# Patient Record
Sex: MALE | Race: WHITE | NOT HISPANIC OR LATINO | URBAN - METROPOLITAN AREA
[De-identification: names, ages, dates, MRNs, and addresses within clinical notes are randomized per-mention and may not be internally consistent; named-entity substitution may affect disease eponyms.]

---

## 2023-07-25 ENCOUNTER — HOSPITAL ENCOUNTER (OUTPATIENT)
Dept: DATA CONVERSION | Facility: HOSPITAL | Age: 58
End: 2023-07-25
Attending: INTERNAL MEDICINE | Admitting: INTERNAL MEDICINE
Payer: COMMERCIAL

## 2023-07-25 DIAGNOSIS — I48.91 UNSPECIFIED ATRIAL FIBRILLATION (MULTI): ICD-10-CM

## 2023-10-21 ENCOUNTER — LAB (OUTPATIENT)
Dept: LAB | Facility: LAB | Age: 58
End: 2023-10-21
Payer: COMMERCIAL

## 2023-10-21 DIAGNOSIS — I48.0 PAROXYSMAL ATRIAL FIBRILLATION (MULTI): Primary | ICD-10-CM

## 2023-10-21 DIAGNOSIS — Z01.818 ENCOUNTER FOR OTHER PREPROCEDURAL EXAMINATION: ICD-10-CM

## 2023-10-21 LAB
ANION GAP SERPL CALC-SCNC: 15 MMOL/L (ref 10–20)
BUN SERPL-MCNC: 16 MG/DL (ref 6–23)
CALCIUM SERPL-MCNC: 9.5 MG/DL (ref 8.6–10.6)
CHLORIDE SERPL-SCNC: 107 MMOL/L (ref 98–107)
CO2 SERPL-SCNC: 24 MMOL/L (ref 21–32)
CREAT SERPL-MCNC: 1.18 MG/DL (ref 0.5–1.3)
ERYTHROCYTE [DISTWIDTH] IN BLOOD BY AUTOMATED COUNT: 12.1 % (ref 11.5–14.5)
GFR SERPL CREATININE-BSD FRML MDRD: 71 ML/MIN/1.73M*2
GLUCOSE SERPL-MCNC: 100 MG/DL (ref 74–99)
HCT VFR BLD AUTO: 48.9 % (ref 41–52)
HGB BLD-MCNC: 16.8 G/DL (ref 13.5–17.5)
INR PPP: 1.5 (ref 0.9–1.1)
MCH RBC QN AUTO: 29.2 PG (ref 26–34)
MCHC RBC AUTO-ENTMCNC: 34.4 G/DL (ref 32–36)
MCV RBC AUTO: 85 FL (ref 80–100)
NRBC BLD-RTO: 0 /100 WBCS (ref 0–0)
PLATELET # BLD AUTO: 176 X10*3/UL (ref 150–450)
PMV BLD AUTO: 12.6 FL (ref 7.5–11.5)
POTASSIUM SERPL-SCNC: 4.3 MMOL/L (ref 3.5–5.3)
PROTHROMBIN TIME: 17.2 SECONDS (ref 9.8–12.8)
RBC # BLD AUTO: 5.76 X10*6/UL (ref 4.5–5.9)
SODIUM SERPL-SCNC: 142 MMOL/L (ref 136–145)
WBC # BLD AUTO: 7 X10*3/UL (ref 4.4–11.3)

## 2023-10-21 PROCEDURE — 36415 COLL VENOUS BLD VENIPUNCTURE: CPT

## 2023-10-21 PROCEDURE — 85027 COMPLETE CBC AUTOMATED: CPT

## 2023-10-21 PROCEDURE — 80048 BASIC METABOLIC PNL TOTAL CA: CPT

## 2023-10-21 PROCEDURE — 85610 PROTHROMBIN TIME: CPT

## 2023-11-02 ENCOUNTER — ANESTHESIA EVENT (OUTPATIENT)
Dept: CARDIOLOGY | Facility: HOSPITAL | Age: 58
End: 2023-11-02
Payer: COMMERCIAL

## 2023-11-02 PROBLEM — I48.20 CHRONIC ATRIAL FIBRILLATION (MULTI): Status: ACTIVE | Noted: 2023-11-02

## 2023-11-02 NOTE — H&P
History Of Present Illness  Justin Martinez is a 58 y.o. male presenting with paroxysmal atrial fibrillation. PMHX notable for   1. HTN  2. Sleep apnea - moderate to severe diagnosed in 2021. Being fitted now with the CPAP  3. Calcium Score 105 2021  4. Atrial fibrillation - index diagnosis in 2021. He feels this was after COVID infection. Every time he has a viral illness he does develop AF. However he has been in AF since May of 2023. In July 2023 monitor showed persistent atrial fibrillation. On Xarelto for more than 3 weeks now.      July 2023: We discussed the atrial fibrillation at length. For now it is important that remains on OAT. Successful DCCV done July 26.      TESTING:    -ECHO (2021) structurally and functionally normal      -MONITOR: (June 2023) Preventice 7 days showed AF/AFL ([place] 100%) and 3 runs of NSVT longest lasting 10 beats. Min HR 31 bpm, Max  bpm, Avg HR 75 bpm. VE (Phyllis <1%)     He presents today for RFA atrial fibrillation.       Past Medical History  Past Medical History:   Diagnosis Date    Encounter for screening for cardiovascular disorders 10/07/2019    Screening for cardiovascular condition    Encounter for screening for malignant neoplasm of prostate 10/07/2019    Screening for prostate cancer       Surgical History  No past surgical history on file.     Social History  He has no history on file for tobacco use, alcohol use, and drug use.    Family History  No family history on file.     Allergies  Patient has no allergy information on record.    Review of Systems   Cardiovascular:  Positive for palpitations.   All other systems reviewed and are negative.       Physical Exam  Vitals reviewed.   Constitutional:       General: He is not in acute distress.     Appearance: Normal appearance. He is normal weight.   HENT:      Head: Normocephalic and atraumatic.      Nose: Nose normal. No congestion or rhinorrhea.      Mouth/Throat:      Mouth: Mucous membranes are moist.       Pharynx: Oropharynx is clear. No oropharyngeal exudate or posterior oropharyngeal erythema.   Eyes:      Extraocular Movements: Extraocular movements intact.      Conjunctiva/sclera: Conjunctivae normal.      Pupils: Pupils are equal, round, and reactive to light.   Neck:      Vascular: No carotid bruit.   Cardiovascular:      Rate and Rhythm: Normal rate and regular rhythm.      Pulses: Normal pulses.      Heart sounds: Normal heart sounds. No murmur heard.     No friction rub. No gallop.   Pulmonary:      Effort: Pulmonary effort is normal. No respiratory distress.      Breath sounds: Normal breath sounds. No wheezing or rales.   Abdominal:      General: Abdomen is flat. Bowel sounds are normal. There is no distension.      Palpations: Abdomen is soft.      Tenderness: There is no abdominal tenderness.   Musculoskeletal:         General: No swelling. Normal range of motion.      Cervical back: Normal range of motion.   Lymphadenopathy:      Cervical: No cervical adenopathy.   Skin:     General: Skin is warm and dry.      Capillary Refill: Capillary refill takes less than 2 seconds.      Findings: No erythema, lesion or rash.   Neurological:      General: No focal deficit present.      Mental Status: He is alert and oriented to person, place, and time. Mental status is at baseline.   Psychiatric:         Mood and Affect: Mood normal.         Behavior: Behavior normal.         Thought Content: Thought content normal.         Judgment: Judgment normal.          Last Recorded Vitals  There were no vitals taken for this visit.    Relevant Results        Results reviewed      Assessment/Plan   Principal Problem:    Chronic atrial fibrillation (CMS/HCC)    -RFA afib, general anesthesia        I spent 30 minutes in the professional and overall care of this patient.      Pan Rausch MD

## 2023-11-03 ENCOUNTER — ANESTHESIA (OUTPATIENT)
Dept: CARDIOLOGY | Facility: HOSPITAL | Age: 58
End: 2023-11-03
Payer: COMMERCIAL

## 2023-11-03 ENCOUNTER — HOSPITAL ENCOUNTER (OUTPATIENT)
Facility: HOSPITAL | Age: 58
Discharge: HOME | End: 2023-11-03
Attending: INTERNAL MEDICINE | Admitting: INTERNAL MEDICINE
Payer: COMMERCIAL

## 2023-11-03 DIAGNOSIS — I48.91 UNSPECIFIED ATRIAL FIBRILLATION (MULTI): ICD-10-CM

## 2023-11-03 PROBLEM — G89.29 CHRONIC NECK PAIN: Status: ACTIVE | Noted: 2023-11-03

## 2023-11-03 PROBLEM — K21.9 GASTROESOPHAGEAL REFLUX DISEASE: Status: ACTIVE | Noted: 2023-11-03

## 2023-11-03 PROBLEM — M54.2 CHRONIC NECK PAIN: Status: ACTIVE | Noted: 2023-11-03

## 2023-11-03 PROBLEM — Z79.01 ANTICOAGULANT LONG-TERM USE: Status: ACTIVE | Noted: 2023-11-03

## 2023-11-03 PROBLEM — I10 HTN (HYPERTENSION): Status: ACTIVE | Noted: 2023-11-03

## 2023-11-03 PROBLEM — G47.33 OSA (OBSTRUCTIVE SLEEP APNEA): Status: ACTIVE | Noted: 2023-11-03

## 2023-11-03 PROCEDURE — 93656 COMPRE EP EVAL ABLTJ ATR FIB: CPT | Performed by: INTERNAL MEDICINE

## 2023-11-03 PROCEDURE — 7100000011 HC EXTENDED STAY RECOVERY HOURLY - NURSING UNIT

## 2023-11-03 PROCEDURE — 2500000004 HC RX 250 GENERAL PHARMACY W/ HCPCS (ALT 636 FOR OP/ED): Performed by: INTERNAL MEDICINE

## 2023-11-03 PROCEDURE — 93657 TX L/R ATRIAL FIB ADDL: CPT | Performed by: INTERNAL MEDICINE

## 2023-11-03 PROCEDURE — 36620 INSERTION CATHETER ARTERY: CPT | Performed by: ANESTHESIOLOGY

## 2023-11-03 PROCEDURE — 92960 CARDIOVERSION ELECTRIC EXT: CPT | Mod: 59 | Performed by: INTERNAL MEDICINE

## 2023-11-03 PROCEDURE — C1730 CATH, EP, 19 OR FEW ELECT: HCPCS | Performed by: INTERNAL MEDICINE

## 2023-11-03 PROCEDURE — 92960 CARDIOVERSION ELECTRIC EXT: CPT | Performed by: INTERNAL MEDICINE

## 2023-11-03 PROCEDURE — C1766 INTRO/SHEATH,STRBLE,NON-PEEL: HCPCS | Performed by: INTERNAL MEDICINE

## 2023-11-03 PROCEDURE — A93656 PR EPHYS EVL TRNSPTL TX ATRIAL FIB ISOLAT PULM VEIN: Performed by: ANESTHESIOLOGY

## 2023-11-03 PROCEDURE — 3700000002 HC GENERAL ANESTHESIA TIME - EACH INCREMENTAL 1 MINUTE: Performed by: INTERNAL MEDICINE

## 2023-11-03 PROCEDURE — 2500000004 HC RX 250 GENERAL PHARMACY W/ HCPCS (ALT 636 FOR OP/ED)

## 2023-11-03 PROCEDURE — 2720000007 HC OR 272 NO HCPCS: Performed by: INTERNAL MEDICINE

## 2023-11-03 PROCEDURE — 2780000003 HC OR 278 NO HCPCS: Performed by: INTERNAL MEDICINE

## 2023-11-03 PROCEDURE — 7100000009 HC PHASE TWO TIME - INITIAL BASE CHARGE: Performed by: INTERNAL MEDICINE

## 2023-11-03 PROCEDURE — 85347 COAGULATION TIME ACTIVATED: CPT | Performed by: INTERNAL MEDICINE

## 2023-11-03 PROCEDURE — 2500000005 HC RX 250 GENERAL PHARMACY W/O HCPCS

## 2023-11-03 PROCEDURE — C1760 CLOSURE DEV, VASC: HCPCS | Performed by: INTERNAL MEDICINE

## 2023-11-03 PROCEDURE — 7100000010 HC PHASE TWO TIME - EACH INCREMENTAL 1 MINUTE: Performed by: INTERNAL MEDICINE

## 2023-11-03 PROCEDURE — 76937 US GUIDE VASCULAR ACCESS: CPT | Performed by: ANESTHESIOLOGY

## 2023-11-03 PROCEDURE — C1759 CATH, INTRA ECHOCARDIOGRAPHY: HCPCS | Performed by: INTERNAL MEDICINE

## 2023-11-03 PROCEDURE — C1732 CATH, EP, DIAG/ABL, 3D/VECT: HCPCS | Performed by: INTERNAL MEDICINE

## 2023-11-03 PROCEDURE — 85347 COAGULATION TIME ACTIVATED: CPT

## 2023-11-03 PROCEDURE — A93656 PR EPHYS EVL TRNSPTL TX ATRIAL FIB ISOLAT PULM VEIN

## 2023-11-03 PROCEDURE — 3700000001 HC GENERAL ANESTHESIA TIME - INITIAL BASE CHARGE: Performed by: INTERNAL MEDICINE

## 2023-11-03 RX ORDER — ROCURONIUM BROMIDE 10 MG/ML
INJECTION, SOLUTION INTRAVENOUS AS NEEDED
Status: DISCONTINUED | OUTPATIENT
Start: 2023-11-03 | End: 2023-11-03

## 2023-11-03 RX ORDER — HEPARIN SODIUM 1000 [USP'U]/ML
INJECTION, SOLUTION INTRAVENOUS; SUBCUTANEOUS AS NEEDED
Status: DISCONTINUED | OUTPATIENT
Start: 2023-11-03 | End: 2023-11-03 | Stop reason: HOSPADM

## 2023-11-03 RX ORDER — PROPOFOL 10 MG/ML
INJECTION, EMULSION INTRAVENOUS CONTINUOUS PRN
Status: DISCONTINUED | OUTPATIENT
Start: 2023-11-03 | End: 2023-11-03

## 2023-11-03 RX ORDER — PHENYLEPHRINE HCL IN 0.9% NACL 1 MG/10 ML
SYRINGE (ML) INTRAVENOUS AS NEEDED
Status: DISCONTINUED | OUTPATIENT
Start: 2023-11-03 | End: 2023-11-03

## 2023-11-03 RX ORDER — DILTIAZEM HYDROCHLORIDE 180 MG/1
180 CAPSULE, COATED, EXTENDED RELEASE ORAL DAILY
COMMUNITY
End: 2023-12-08 | Stop reason: SDUPTHER

## 2023-11-03 RX ORDER — PROTAMINE SULFATE 10 MG/ML
INJECTION, SOLUTION INTRAVENOUS AS NEEDED
Status: DISCONTINUED | OUTPATIENT
Start: 2023-11-03 | End: 2023-11-03

## 2023-11-03 RX ORDER — FAMOTIDINE 20 MG/1
20 TABLET, FILM COATED ORAL DAILY
COMMUNITY

## 2023-11-03 RX ORDER — MIDAZOLAM HYDROCHLORIDE 1 MG/ML
INJECTION INTRAMUSCULAR; INTRAVENOUS AS NEEDED
Status: DISCONTINUED | OUTPATIENT
Start: 2023-11-03 | End: 2023-11-03

## 2023-11-03 RX ORDER — PROPOFOL 10 MG/ML
INJECTION, EMULSION INTRAVENOUS AS NEEDED
Status: DISCONTINUED | OUTPATIENT
Start: 2023-11-03 | End: 2023-11-03

## 2023-11-03 RX ORDER — PHENYLEPHRINE 10 MG/250 ML(40 MCG/ML)IN 0.9 % SOD.CHLORIDE INTRAVENOUS
CONTINUOUS PRN
Status: DISCONTINUED | OUTPATIENT
Start: 2023-11-03 | End: 2023-11-03

## 2023-11-03 RX ORDER — FENTANYL CITRATE 50 UG/ML
INJECTION, SOLUTION INTRAMUSCULAR; INTRAVENOUS AS NEEDED
Status: DISCONTINUED | OUTPATIENT
Start: 2023-11-03 | End: 2023-11-03

## 2023-11-03 RX ORDER — LIDOCAINE HYDROCHLORIDE 20 MG/ML
INJECTION, SOLUTION INFILTRATION; PERINEURAL AS NEEDED
Status: DISCONTINUED | OUTPATIENT
Start: 2023-11-03 | End: 2023-11-03

## 2023-11-03 RX ORDER — HEPARIN SODIUM 10000 [USP'U]/100ML
INJECTION, SOLUTION INTRAVENOUS CONTINUOUS PRN
Status: DISCONTINUED | OUTPATIENT
Start: 2023-11-03 | End: 2023-11-03 | Stop reason: HOSPADM

## 2023-11-03 RX ORDER — DEXAMETHASONE SODIUM PHOSPHATE 4 MG/ML
INJECTION, SOLUTION INTRA-ARTICULAR; INTRALESIONAL; INTRAMUSCULAR; INTRAVENOUS; SOFT TISSUE AS NEEDED
Status: DISCONTINUED | OUTPATIENT
Start: 2023-11-03 | End: 2023-11-03

## 2023-11-03 RX ORDER — BISMUTH SUBSALICYLATE 262 MG
1 TABLET,CHEWABLE ORAL DAILY
COMMUNITY

## 2023-11-03 RX ADMIN — MIDAZOLAM HYDROCHLORIDE 2 MG: 1 INJECTION, SOLUTION INTRAMUSCULAR; INTRAVENOUS at 07:42

## 2023-11-03 RX ADMIN — ROCURONIUM 20 MG: 50 INJECTION, SOLUTION INTRAVENOUS at 09:02

## 2023-11-03 RX ADMIN — DEXAMETHASONE SODIUM PHOSPHATE 8 MG: 4 INJECTION, SOLUTION INTRAMUSCULAR; INTRAVENOUS at 08:41

## 2023-11-03 RX ADMIN — ROCURONIUM 80 MG: 50 INJECTION, SOLUTION INTRAVENOUS at 08:06

## 2023-11-03 RX ADMIN — PROTAMINE SULFATE 30 MG: 10 INJECTION, SOLUTION INTRAVENOUS at 10:13

## 2023-11-03 RX ADMIN — Medication 0.2 MCG/KG/MIN: at 08:20

## 2023-11-03 RX ADMIN — FENTANYL CITRATE 50 MCG: 50 INJECTION, SOLUTION INTRAMUSCULAR; INTRAVENOUS at 08:05

## 2023-11-03 RX ADMIN — Medication 120 MCG: at 08:18

## 2023-11-03 RX ADMIN — SODIUM CHLORIDE, SODIUM LACTATE, POTASSIUM CHLORIDE, AND CALCIUM CHLORIDE: 600; 310; 30; 20 INJECTION, SOLUTION INTRAVENOUS at 07:37

## 2023-11-03 RX ADMIN — LIDOCAINE HYDROCHLORIDE 100 MG: 20 INJECTION, SOLUTION INFILTRATION; PERINEURAL at 08:05

## 2023-11-03 RX ADMIN — Medication 120 MCG: at 08:20

## 2023-11-03 RX ADMIN — SUGAMMADEX 200 MG: 100 INJECTION, SOLUTION INTRAVENOUS at 10:22

## 2023-11-03 RX ADMIN — PROPOFOL 125 MCG/KG/MIN: 10 INJECTION, EMULSION INTRAVENOUS at 08:05

## 2023-11-03 RX ADMIN — ROCURONIUM 20 MG: 50 INJECTION, SOLUTION INTRAVENOUS at 09:44

## 2023-11-03 RX ADMIN — PROPOFOL 150 MG: 10 INJECTION, EMULSION INTRAVENOUS at 08:05

## 2023-11-03 SDOH — HEALTH STABILITY: MENTAL HEALTH: CURRENT SMOKER: 0

## 2023-11-03 ASSESSMENT — PAIN - FUNCTIONAL ASSESSMENT
PAIN_FUNCTIONAL_ASSESSMENT: 0-10

## 2023-11-03 ASSESSMENT — PAIN SCALES - GENERAL
PAINLEVEL_OUTOF10: 0 - NO PAIN
PAIN_LEVEL: 0
PAINLEVEL_OUTOF10: 0 - NO PAIN

## 2023-11-03 ASSESSMENT — COLUMBIA-SUICIDE SEVERITY RATING SCALE - C-SSRS
6. HAVE YOU EVER DONE ANYTHING, STARTED TO DO ANYTHING, OR PREPARED TO DO ANYTHING TO END YOUR LIFE?: NO
2. HAVE YOU ACTUALLY HAD ANY THOUGHTS OF KILLING YOURSELF?: NO
1. IN THE PAST MONTH, HAVE YOU WISHED YOU WERE DEAD OR WISHED YOU COULD GO TO SLEEP AND NOT WAKE UP?: NO

## 2023-11-03 ASSESSMENT — ENCOUNTER SYMPTOMS: PALPITATIONS: 1

## 2023-11-03 NOTE — ANESTHESIA PREPROCEDURE EVALUATION
Patient: Justin Martinez    Procedure Information       Date/Time: 11/03/23 0800    Procedure: Ablation A-Fib - SDA/RFA AFIB/CARTO/GA WHOLE    Location: [facility] [place] BIPLANE / Virtual [facility] [place] MAT 3529 Cardiac Cath Lab    Providers: Juan Díaz MD            Relevant Problems   Anesthesia (within normal limits)      Cardiovascular   (+) Chronic atrial fibrillation (CMS/HCC)   (+) HTN (hypertension)      Endocrine (within normal limits)      GI   (+) Gastroesophageal reflux disease      /Renal (within normal limits)      Neuro/Psych (within normal limits)      Pulmonary   (+) ALEX (obstructive sleep apnea)      GI/Hepatic (within normal limits)      Hematology   (+) Anticoagulant long-term use      Musculoskeletal   (+) Chronic neck pain      Eyes, Ears, Nose, and Throat (within normal limits)      Infectious Disease (within normal limits)       Clinical information reviewed:    Allergies    Med Hx             NPO Detail:  NPO/Void Status  Date of Last Liquid: 11/03/23  Time of Last Liquid: 0000  Date of Last Solid: 11/03/23  Time of Last Solid: 0000         Physical Exam    Airway  Mallampati: II  TM distance: >3 FB  Neck ROM: full     Cardiovascular   Rhythm: irregular  Rate: abnormal     Dental    Pulmonary - normal exam     Abdominal            Anesthesia Plan    ASA 3     general     The patient is not a current smoker.    intravenous induction   Anesthetic plan and risks discussed with patient and spouse.  Use of blood products discussed with patient and spouse who.    Plan discussed with [facility]AA.

## 2023-11-03 NOTE — ANESTHESIA PROCEDURE NOTES
Peripheral IV  Date/Time: 11/3/2023 8:20 AM  Inserted by: Epifanio Long MD    Placement  Needle size: 18 G  Laterality: right  Location: hand  Site prep: alcohol  Technique: anatomical landmarks  Attempts: 1

## 2023-11-03 NOTE — PROGRESS NOTES
Pharmacy Medication History Review    Justin Martinez is a 58 y.o. male admitted for Chronic atrial fibrillation (CMS/[facility]). Pharmacy reviewed the patient's [id number] medications and allergies for accuracy.    The list below reflects the updated PTA list. Please review each medication in order reconciliation for additional clarification and justification.  Medications Prior to Admission   Medication Sig Dispense Refill Last Dose    dilTIAZem CD (Cardizem CD) 180 mg 24 hr capsule Take 1 capsule (180 mg) by mouth once daily.   11/2/2023    famotidine (Pepcid) 20 mg tablet Take 1 tablet (20 mg) by mouth once daily.   11/3/2023    multivitamin tablet Take 1 tablet by mouth once daily.   11/1/2023    rivaroxaban (Xarelto) 20 mg tablet Take 1 tablet (20 mg) by mouth once daily in the evening. Take with meals. Take with food.   11/1/2023        The list below reflectives the updated allergy list. Please review each documented allergy for additional clarification and justification.  Allergies  Reviewed by Komal Delarosa PharmD on 11/3/2023   No Known Allergies       Sources used: Pharmacy dispense history, OARRs, patient interview (good historian-knew drug name, dose and frequency), and cardiology note from 8/25     Below are additional concerns with the patient's PTA list.  none    Komal Delarosa PharmD  Transitions of Care Pharmacist  Medication reconciliation complete  Please reach out via [place] secure chat for questions, or if no response call [place] or [place]  [facility]

## 2023-11-03 NOTE — DISCHARGE INSTRUCTIONS
* You will need to continue blood thinner (Xarelto) until instructed otherwise. It is important not to interrupt blood thinner for any reason (other than an emergency) during the 1st month after ablation. Xarelto needs to be taken daily with a meal in order for the drug to be properly absorbed (eg do not take it on an empty stomach).    * You will be on Pantoprazole (a heartburn medicine) for 4 weeks to protect the esophagus as it can become irritated with ablation. It is very important that you take this medication.    * All other medications will generally remain the same unless you are told otherwise.  Resume taking your home medications today (including blood thinner) as listed on the discharge instructions.    * In the first week post-ablation you should take it easy. No heavy lifting or heavy exercise, no treadmill. You can use the stairs if needed but go slowly and minimize the number of times up and down.    * Some minor bruising is common at each groin access site with minor soreness as if you had banged the area. Bruising may occasionally be seen to extend down the leg. This is normal as is an occasional small quarter sized bump in the area. If larger swelling or more significant pain occurs at the area, please contact the office or go the nearest Emergency Room.    * You may have some minor chest pain for the next week or so. The pain will often worsen with a deep breath and be better when leaning forward. This is pericardial chest pain from the ablation and is generally not of concern. It should resolve within a week although it might increase for a day or so after the ablation.    * If you develop unexplained fevers exceeding 100 degrees anytime within the first 3 weeks post-ablation, you need to contact the office. Low grade fevers of around 99 degrees are common in the first day or so post-ablation.    * Atrial fibrillation (AFib) can recur in all patients who undergo this ablation for up to 4-8 weeks  post-ablation. The ablation itself can cause inflammation (pericarditis) in the atria and this can cause AFib. Some patients will actually experience an increased amount of atrial arrhythmia early after ablation. Approximately 1/3 of patients will have this early recurrence of AFib. Medications should be continued and your heart rate controlled. Nothing else needs be done initially except waiting as in many cases these episodes of AFib will prove self limited.    * Continue to follow up with your primary care physician, primary cardiologist, and any other specialists you normally see.    *No driving for 2 days post procedure (IF you were driving prior to procedure)    *Diet: Heart healthy    Call Provider If:  Breathing faster than normal.     Fever of 100.4 F (38 C) or higher.     Chills.     Any new concerning symptoms.     Passing out.     Patient Instructions, Next 24 hours:  DO NOT drive a car, operate machinery or power tools.  It is recommended that a responsible adult be with you for the first 24 hours.     DO NOT drink any alcoholic drinks or take any non-prescriptive medications that contain alcohol for the first 24 hours.     DO NOT make any important decisions for the first 24 hours.    Activity:  You are advised to go directly home from the hospital.     DO NOT lift anything heavier than 10 pounds for one week, this allows for proper healing of the groin.     No excessive exercise or treadmill use for one week. You may walk and do stairs, slowly.     No sexual activities for 24 hours after you arrive home.    Wound Care:  If slight bleeding should occur at groin site, lie down and have someone apply firm pressure just above the puncture site for 5 minutes.  If it continues or is profuse, call 911. Always notify your doctor if bleeding occurs.     Keep site clean and dry. Let air dry or you may use a simple bandaid.     Gently cleanse the puncture site in your groin with soap and water only.     You may  experience some tenderness, bruising or minimal inflammation.  If you have any concerns, you may contact the EP Lab or if any of these symptoms become excessive, contact your electrophysiologist or go to the emergency room.     No tub baths, soaking, hot tubs, or swimming for one week.     May shower the next day after your procedure.    Other Instructions:  If you have any questions about the effects of the sedative drugs or groin care, call the physician who performed your procedure.    FOLLOW UP:  1) Primary care physician 2 weeks--call to schedule    2) Mary Edwards CNP ( Electrophysiology) 1 month after ablation  Schedulers will notify you of appointment. If you haven't heard in 1 week, please call [phone]

## 2023-11-03 NOTE — ANESTHESIA PROCEDURE NOTES
Airway  Date/Time: 11/3/2023 8:12 AM  Urgency: elective    Airway not difficult    Staffing  Performed: VADIM   Authorized by: Epifanio Long MD    Performed by: VADIM Lua  Patient location during procedure: OR    Indications and Patient Condition  Indications for airway management: anesthesia and airway protection  Spontaneous ventilation: present  Sedation level: deep  Preoxygenated: yes  Patient position: sniffing  Mask difficulty assessment: 1 - vent by mask    Final Airway Details  Final airway type: endotracheal airway      Successful airway: ETT  Cuffed: yes   Successful intubation technique: direct laryngoscopy  Facilitating devices/methods: cricoid pressure  Endotracheal tube insertion site: oral  Blade: Al  Blade size: #4  ETT size (mm): 7.5  Cormack-Lehane Classification: grade IIb - view of arytenoids or posterior of glottis only  Placement verified by: chest auscultation and capnometry   Cuff volume (mL): 8  Measured from: teeth  ETT to teeth (cm): 23  Number of attempts at approach: 1    Additional Comments  Intubated by SAA1 Santi Chamberlain

## 2023-11-03 NOTE — ANESTHESIA POSTPROCEDURE EVALUATION
Patient: Justin Martinez    Procedure Summary       Date: 11/03/23 Room / [place] [facility] [place] [facility] / [facility] [place] MAT 3529 Cardiac Cath Lab    Anesthesia Start: 0737 Anesthesia Stop: 1045    Procedure: Ablation A-Fib Diagnosis:       Unspecified atrial fibrillation (CMS/HCC)      (Unspecified atrial fibrillation (CMS/HCC) [I48.91])    Providers: Juan Díaz MD Responsible Provider: Epifanio Long MD    Anesthesia Type: general ASA Status: 3            Anesthesia Type: general    Vitals Value Taken Time   /81 11/03/23 1046   Temp 36.5 11/03/23 1046   Pulse 61 11/03/23 1046   Resp 14 11/03/23 1046   SpO2 95 11/03/23 1046       Anesthesia Post Evaluation    Patient location during evaluation: PACU  Patient participation: complete - patient participated  Level of consciousness: awake  Pain score: 0  Pain management: adequate  Airway patency: patent  There was medical reason for not screening for obstructive sleep apnea and/or not using of two or more mitigation strategies.Cardiovascular status: acceptable, hemodynamically stable and blood pressure returned to baseline  Respiratory status: acceptable and room air  Hydration status: acceptable      There were no known notable events for this encounter.

## 2023-11-03 NOTE — ANESTHESIA PROCEDURE NOTES
Arterial Line:    Date/Time: 11/3/2023 8:22 AM    Staffing  Performed: CAA   Authorized by: Epifanio Long MD    Performed by: VADIM Lua    An arterial line was placed. in the OR for the following indication(s): continuous blood pressure monitoring.    A 20 gauge (size), 1 and 1/4 inch (length), Angiocath (type) catheter was placed into the Left radial artery, secured by tape,   Seldinger technique not used.  Events:  patient tolerated procedure well with no complications.

## 2023-11-10 LAB
ACT BLD: 288 SEC (ref 96–152)
ACT BLD: 324 SEC (ref 96–152)

## 2023-12-07 ENCOUNTER — APPOINTMENT (OUTPATIENT)
Dept: CARDIOLOGY | Facility: CLINIC | Age: 58
End: 2023-12-07
Payer: COMMERCIAL

## 2023-12-07 PROBLEM — R12 HEARTBURN: Status: ACTIVE | Noted: 2023-12-07

## 2023-12-07 PROBLEM — K42.9 UMBILICAL HERNIA WITHOUT OBSTRUCTION AND WITHOUT GANGRENE: Status: ACTIVE | Noted: 2023-12-07

## 2023-12-07 PROBLEM — R00.2 INTERMITTENT PALPITATIONS: Status: ACTIVE | Noted: 2023-12-07

## 2023-12-07 PROBLEM — R06.83 SNORING: Status: ACTIVE | Noted: 2023-12-07

## 2023-12-07 PROBLEM — I25.84 CALCIFICATION OF CORONARY ARTERY: Status: ACTIVE | Noted: 2022-05-02

## 2023-12-07 PROBLEM — I25.10 CALCIFICATION OF CORONARY ARTERY: Status: ACTIVE | Noted: 2022-05-02

## 2023-12-07 PROBLEM — D22.5 MELANOCYTIC NEVI OF TRUNK: Status: ACTIVE | Noted: 2022-05-02

## 2023-12-07 PROBLEM — I87.2 CHRONIC VENOUS INSUFFICIENCY: Status: ACTIVE | Noted: 2022-05-02

## 2023-12-07 PROBLEM — Z85.828 HISTORY OF BASAL CELL CARCINOMA (BCC): Status: ACTIVE | Noted: 2022-05-02

## 2023-12-07 PROBLEM — R60.9 EDEMA: Status: ACTIVE | Noted: 2023-12-07

## 2023-12-07 PROBLEM — J01.90 ACUTE RHINOSINUSITIS: Status: ACTIVE | Noted: 2023-12-07

## 2023-12-07 PROBLEM — E78.5 DYSLIPIDEMIA: Status: ACTIVE | Noted: 2023-12-07

## 2023-12-07 PROBLEM — I25.10 ATHEROSCLEROSIS OF CORONARY ARTERY: Status: ACTIVE | Noted: 2023-12-07

## 2023-12-07 PROBLEM — E66.9 OBESITY WITH BODY MASS INDEX 30 OR GREATER: Status: ACTIVE | Noted: 2023-12-07

## 2023-12-07 PROBLEM — L81.4 OTHER MELANIN HYPERPIGMENTATION: Status: ACTIVE | Noted: 2022-05-02

## 2023-12-07 PROBLEM — L57.0 ACTINIC KERATOSIS: Status: ACTIVE | Noted: 2022-05-02

## 2023-12-07 RX ORDER — AMLODIPINE BESYLATE 5 MG/1
5 TABLET ORAL DAILY
COMMUNITY
End: 2024-02-09 | Stop reason: SDUPTHER

## 2023-12-07 RX ORDER — ASPIRIN 81 MG/1
TABLET ORAL
COMMUNITY
Start: 2021-04-08

## 2023-12-07 RX ORDER — NEOMYCIN/POLYMYXIN B/PRAMOXINE 3.5-10K-1
CREAM (GRAM) TOPICAL
COMMUNITY
Start: 2022-07-22

## 2023-12-07 RX ORDER — CLOBETASOL PROPIONATE 0.5 MG/G
CREAM TOPICAL
COMMUNITY
Start: 2023-05-19

## 2023-12-07 RX ORDER — DILTIAZEM HYDROCHLORIDE 180 MG/1
1 CAPSULE, EXTENDED RELEASE ORAL DAILY
COMMUNITY
Start: 2023-05-17 | End: 2024-02-09 | Stop reason: ALTCHOICE

## 2023-12-08 ENCOUNTER — OFFICE VISIT (OUTPATIENT)
Dept: CARDIOLOGY | Facility: CLINIC | Age: 58
End: 2023-12-08
Payer: COMMERCIAL

## 2023-12-08 VITALS
BODY MASS INDEX: 37.98 KG/M2 | OXYGEN SATURATION: 95 % | WEIGHT: 272.3 LBS | HEART RATE: 75 BPM | SYSTOLIC BLOOD PRESSURE: 137 MMHG | DIASTOLIC BLOOD PRESSURE: 81 MMHG

## 2023-12-08 DIAGNOSIS — I48.20 CHRONIC ATRIAL FIBRILLATION (MULTI): Primary | ICD-10-CM

## 2023-12-08 PROCEDURE — 93005 ELECTROCARDIOGRAM TRACING: CPT | Performed by: NURSE PRACTITIONER

## 2023-12-08 PROCEDURE — 99214 OFFICE O/P EST MOD 30 MIN: CPT | Performed by: NURSE PRACTITIONER

## 2023-12-08 PROCEDURE — 1036F TOBACCO NON-USER: CPT | Performed by: NURSE PRACTITIONER

## 2023-12-08 PROCEDURE — 3079F DIAST BP 80-89 MM HG: CPT | Performed by: NURSE PRACTITIONER

## 2023-12-08 PROCEDURE — 93010 ELECTROCARDIOGRAM REPORT: CPT | Performed by: INTERNAL MEDICINE

## 2023-12-08 PROCEDURE — 3075F SYST BP GE 130 - 139MM HG: CPT | Performed by: NURSE PRACTITIONER

## 2023-12-08 ASSESSMENT — ENCOUNTER SYMPTOMS
LIGHT-HEADEDNESS: 0
PND: 0
SHORTNESS OF BREATH: 0
SPUTUM PRODUCTION: 0
SORE THROAT: 0
NEAR-SYNCOPE: 0
VOMITING: 0
NAUSEA: 0
LOSS OF SENSATION IN FEET: 0
HEADACHES: 0
ABDOMINAL PAIN: 0
FALLS: 0
SNORING: 0
DYSPNEA ON EXERTION: 0
PALPITATIONS: 0
OCCASIONAL FEELINGS OF UNSTEADINESS: 0
IRREGULAR HEARTBEAT: 0
DIARRHEA: 0
BLURRED VISION: 0
FEVER: 0
MYALGIAS: 0
DOUBLE VISION: 0
DEPRESSION: 0
DIZZINESS: 0
HEMOPTYSIS: 0
ORTHOPNEA: 0
COUGH: 0
SYNCOPE: 0
DIAPHORESIS: 0
WEAKNESS: 0

## 2023-12-08 ASSESSMENT — LIFESTYLE VARIABLES
SKIP TO QUESTIONS 9-10: 1
HOW OFTEN DO YOU HAVE SIX OR MORE DRINKS ON ONE OCCASION: NEVER
HOW OFTEN DO YOU HAVE A DRINK CONTAINING ALCOHOL: MONTHLY OR LESS
HOW MANY STANDARD DRINKS CONTAINING ALCOHOL DO YOU HAVE ON A TYPICAL DAY: 1 OR 2
AUDIT-C TOTAL SCORE: 1

## 2023-12-08 ASSESSMENT — PAIN SCALES - GENERAL: PAINLEVEL: 2

## 2023-12-08 NOTE — PROGRESS NOTES
Subjective   Justin Martinez is a 58 y.o. male.    Chief Complaint:  Atrial Fibrillation    Justin Martinez is a 58 year-old with  1. HTN  2. Sleep apnea - moderate to severe diagnosed in 2021  3. Calcium Score 105 2021  4. Atrial fibrillation - index diagnosis in 2021. He feels this was after COVID infection. Every time he has a viral illness he does develop AF. However he has been in AF since May of 2023. In July 2023 monitor showed persistent atrial fibrillation.    Successful DCCV done July 26 2023    TESTING:    -ECHO (2021) structurally and functionally normal     -MONITOR: (June 2023) Preventice 7 days showed AF/AFL ([place] 100%) and 3 runs of NSVT longest lasting 10 beats. Min HR 31 bpm, Max  bpm, Avg HR 75 bpm. VE ([place] <1%)     Now s/p Afib RFA with Dr. Díaz 11/3/2023   ECG 12/8/2023  NSR HR 71 bpm    TODAY Patient presents for 1 month follow up post Afib ablation and he is doing well. He still wears his apple watch and there has been no Afib recurrence since the procedure.  He has noticed more energy and denies any palpitations, lightheadedness and SOB.  He denies any pain/swelling/bleeding at the right groin site.      /81 (BP Location: Left arm, Patient Position: Sitting, BP Cuff Size: Large adult)   Pulse 75   Wt 124 kg (272 lb 4.8 oz)   SpO2 95%   BMI 37.98 kg/m²     Current Outpatient Medications on File Prior to Visit   Medication Sig Dispense Refill    clobetasol (Temovate) 0.05 % cream APPLY TO LOWER LEGS TWICE DAILY FOR 2 WEEKS OR UNTIL CLEAR. THEN USE AS NEEDED FOR FLARES      dilTIAZem XR (Dilacor XR) 180 mg 24 hr capsule Take 1 capsule (180 mg) by mouth once daily.      famotidine (Pepcid) 20 mg tablet Take 1 tablet (20 mg) by mouth once daily.      multivitamin tablet Take 1 tablet by mouth once daily.      mv-min-folic acid-lutein 200-137.5 mcg tablet,chewable Chew once daily.      rivaroxaban (Xarelto) 20 mg tablet Take 1 tablet (20 mg) by mouth once daily in the evening.  Take with meals. Take with food.      [DISCONTINUED] dilTIAZem CD (Cardizem CD) 180 mg 24 hr capsule Take 1 capsule (180 mg) by mouth once daily.      amLODIPine (Norvasc) 5 mg tablet Take 1 tablet (5 mg) by mouth once daily.      aspirin 81 mg EC tablet Take by mouth once daily.       No current facility-administered medications on file prior to visit.         Review of Systems   Constitutional: Negative for diaphoresis, fever and malaise/fatigue.   HENT:  Negative for congestion and sore throat.    Eyes:  Negative for blurred vision and double vision.   Cardiovascular:  Negative for chest pain, dyspnea on exertion, irregular heartbeat, leg swelling, near-syncope, orthopnea, palpitations, paroxysmal nocturnal dyspnea and syncope.   Respiratory:  Negative for cough, hemoptysis, shortness of breath, snoring and sputum production.    Hematologic/Lymphatic: Negative for bleeding problem.   Skin:  Negative for rash.   Musculoskeletal:  Negative for falls, joint pain and myalgias.   Gastrointestinal:  Negative for abdominal pain, diarrhea, nausea and vomiting.   Neurological:  Negative for dizziness, headaches, light-headedness and weakness.   All other systems reviewed and are negative.      Objective   Constitutional:       Appearance: Healthy appearance. Not in distress.   Eyes:      Conjunctiva/sclera: Conjunctivae normal.   HENT:      Nose: Nose normal.    Mouth/Throat:      Pharynx: Oropharynx is clear.   Neck:      Vascular: No JVR. JVD normal.   Pulmonary:      Effort: Pulmonary effort is normal.      Breath sounds: Normal breath sounds. No wheezing. No rhonchi.   Chest:      Chest wall: Not tender to palpatation.   Cardiovascular:      Normal rate. Regular rhythm.      Murmurs: There is no murmur.      No rub.   Pulses:     Intact distal pulses.   Edema:     Peripheral edema absent.   Abdominal:      General: Bowel sounds are normal.      Palpations: Abdomen is soft.   Musculoskeletal: Normal range of motion.       Cervical back: Neck supple. Skin:     General: Skin is warm and dry.      Comments: Right groin site well approximated, no bruising/bleeding/swelling/redness/pain   Neurological:      Mental Status: Alert and oriented to person, place and time.      Motor: Motor function is intact.       Lab Review:   Lab Results   Component Value Date     10/21/2023    K 4.3 10/21/2023     10/21/2023    CO2 24 10/21/2023    BUN 16 10/21/2023    CREATININE 1.18 10/21/2023    GLUCOSE 100 (H) 10/21/2023    CALCIUM 9.5 10/21/2023     Lab Results   Component Value Date    WBC 7.0 10/21/2023    HGB 16.8 10/21/2023    HCT 48.9 10/21/2023    MCV 85 10/21/2023     10/21/2023       Assessment/Plan   The encounter diagnosis was Chronic atrial fibrillation (CMS/HCC).  Patient is doing well 1 month post ablation.    We discussed lifestyle modifications that can help maintain normal sinus rhythm such as exercise, weight loss, managing hypertension, treating sleep apnea, and minimizing alcohol intake.  All questions asked and answered.  We discussed normal symptoms post procedure and when he should come to the office or the ED for follow up.  Recommend uninterrupted AC until 3 months post ablation due to increased risk of stroke during this time.    Plan to continue diltiazem and Xarelto until 3 months post ablation and then will likely switch back to 81 mg ASA and amlodipine.  Follow up with me in 2 months or sooner as needed

## 2023-12-16 LAB
ATRIAL RATE: 71 BPM
P AXIS: 48 DEGREES
P OFFSET: 195 MS
P ONSET: 141 MS
PR INTERVAL: 160 MS
Q ONSET: 221 MS
QRS COUNT: 12 BEATS
QRS DURATION: 96 MS
QT INTERVAL: 392 MS
QTC CALCULATION(BAZETT): 425 MS
QTC FREDERICIA: 414 MS
R AXIS: -5 DEGREES
T AXIS: 14 DEGREES
T OFFSET: 417 MS
VENTRICULAR RATE: 71 BPM

## 2024-02-09 ENCOUNTER — OFFICE VISIT (OUTPATIENT)
Dept: CARDIOLOGY | Facility: CLINIC | Age: 59
End: 2024-02-09
Payer: COMMERCIAL

## 2024-02-09 VITALS
BODY MASS INDEX: 37.72 KG/M2 | WEIGHT: 269.4 LBS | OXYGEN SATURATION: 96 % | SYSTOLIC BLOOD PRESSURE: 138 MMHG | DIASTOLIC BLOOD PRESSURE: 86 MMHG | HEIGHT: 71 IN | HEART RATE: 73 BPM

## 2024-02-09 DIAGNOSIS — I48.20 CHRONIC ATRIAL FIBRILLATION (MULTI): Primary | ICD-10-CM

## 2024-02-09 DIAGNOSIS — I10 ESSENTIAL HYPERTENSION: ICD-10-CM

## 2024-02-09 PROCEDURE — 3075F SYST BP GE 130 - 139MM HG: CPT | Performed by: NURSE PRACTITIONER

## 2024-02-09 PROCEDURE — 99214 OFFICE O/P EST MOD 30 MIN: CPT | Performed by: NURSE PRACTITIONER

## 2024-02-09 PROCEDURE — 93010 ELECTROCARDIOGRAM REPORT: CPT | Performed by: INTERNAL MEDICINE

## 2024-02-09 PROCEDURE — 3079F DIAST BP 80-89 MM HG: CPT | Performed by: NURSE PRACTITIONER

## 2024-02-09 PROCEDURE — 93005 ELECTROCARDIOGRAM TRACING: CPT | Performed by: NURSE PRACTITIONER

## 2024-02-09 PROCEDURE — 1036F TOBACCO NON-USER: CPT | Performed by: NURSE PRACTITIONER

## 2024-02-09 RX ORDER — AMLODIPINE BESYLATE 5 MG/1
5 TABLET ORAL DAILY
Qty: 90 TABLET | Refills: 3 | Status: SHIPPED | OUTPATIENT
Start: 2024-02-09 | End: 2025-02-08

## 2024-02-09 ASSESSMENT — ENCOUNTER SYMPTOMS
DIARRHEA: 0
PND: 0
FEVER: 0
BLURRED VISION: 0
SYNCOPE: 0
IRREGULAR HEARTBEAT: 0
HEMOPTYSIS: 0
NAUSEA: 0
DEPRESSION: 0
ORTHOPNEA: 0
NEAR-SYNCOPE: 0
DOUBLE VISION: 0
OCCASIONAL FEELINGS OF UNSTEADINESS: 0
VOMITING: 0
DIZZINESS: 0
ABDOMINAL PAIN: 0
MYALGIAS: 0
SNORING: 0
LIGHT-HEADEDNESS: 0
SPUTUM PRODUCTION: 0
SORE THROAT: 0
DIAPHORESIS: 0
SHORTNESS OF BREATH: 0
LOSS OF SENSATION IN FEET: 0
WEAKNESS: 0
DYSPNEA ON EXERTION: 0
PALPITATIONS: 0
HEADACHES: 0
FALLS: 0
COUGH: 0

## 2024-02-09 ASSESSMENT — PAIN SCALES - GENERAL: PAINLEVEL: 0-NO PAIN

## 2024-02-09 NOTE — PROGRESS NOTES
"Subjective   Justin Martinez is a 58 y.o. male.    Chief Complaint:  Follow-up    Justin Martinez is a 58 year-old with  1. HTN  2. Sleep apnea - moderate to severe diagnosed in 2021  3. Calcium Score 105 2021  4. Atrial fibrillation - index diagnosis in 2021. He feels this was after COVID infection. Every time he has a viral illness he does develop AF. However he has been in AF since May of 2023. In July 2023 monitor showed persistent atrial fibrillation.    Successful DCCV done July 26 2023  TESTING:    -ECHO (2021) structurally and functionally normal     -MONITOR: (June 2023) Preventice 7 days showed AF/AFL ([place] 100%) and 3 runs of NSVT longest lasting 10 beats. Min HR 31 bpm, Max  bpm, Avg HR 75 bpm. VE ([place] <1%)     Now s/p Afib RFA with Dr. Díaz 11/3/2023   ECG 12/8/2023  NSR HR 71 bpm  ECG 2/9/24 NSR HR 73 bpm    TODAY Patient presents for 3 month follow of Afib ablation. He unfortunately broke his foot at the end of December when he was trying to exercise, so he has not been very active lately.  He only admits to one episode of dizziness that lasted seconds after standing up.  He admits he might be a little more fatigued since taking the diltiazem.  He has been taking the diltiazem and Xarelto and not the Aspirin or Amlodipine.        /86 (BP Location: Right arm, Patient Position: Sitting, BP Cuff Size: Large adult)   Pulse 73   Ht 1.803 m (5' 11\")   Wt 122 kg (269 lb 6.4 oz)   SpO2 96%   BMI 37.57 kg/m²     Current Outpatient Medications on File Prior to Visit   Medication Sig Dispense Refill    aspirin 81 mg EC tablet Take by mouth once daily.      clobetasol (Temovate) 0.05 % cream APPLY TO LOWER LEGS TWICE DAILY FOR 2 WEEKS OR UNTIL CLEAR. THEN USE AS NEEDED FOR FLARES      famotidine (Pepcid) 20 mg tablet Take 1 tablet (20 mg) by mouth once daily.      multivitamin tablet Take 1 tablet by mouth once daily.      mv-min-folic acid-lutein 200-137.5 mcg tablet,chewable Chew once " daily.      [DISCONTINUED] amLODIPine (Norvasc) 5 mg tablet Take 1 tablet (5 mg) by mouth once daily.      [DISCONTINUED] dilTIAZem XR (Dilacor XR) 180 mg 24 hr capsule Take 1 capsule (180 mg) by mouth once daily.      [DISCONTINUED] rivaroxaban (Xarelto) 20 mg tablet Take 1 tablet (20 mg) by mouth once daily in the evening. Take with meals. Take with food.       No current facility-administered medications on file prior to visit.         Review of Systems   Constitutional: Negative for diaphoresis, fever and malaise/fatigue.   HENT:  Negative for congestion and sore throat.    Eyes:  Negative for blurred vision and double vision.   Cardiovascular:  Negative for chest pain, dyspnea on exertion, irregular heartbeat, leg swelling, near-syncope, orthopnea, palpitations, paroxysmal nocturnal dyspnea and syncope.   Respiratory:  Negative for cough, hemoptysis, shortness of breath, snoring and sputum production.    Hematologic/Lymphatic: Negative for bleeding problem.   Skin:  Negative for rash.   Musculoskeletal:  Negative for falls, joint pain and myalgias.   Gastrointestinal:  Negative for abdominal pain, diarrhea, nausea and vomiting.   Neurological:  Negative for dizziness, headaches, light-headedness and weakness.   All other systems reviewed and are negative.      Objective   Constitutional:       Appearance: Healthy appearance. Not in distress.   Eyes:      Conjunctiva/sclera: Conjunctivae normal.   HENT:      Nose: Nose normal.    Mouth/Throat:      Pharynx: Oropharynx is clear.   Neck:      Vascular: No JVR. JVD normal.   Pulmonary:      Effort: Pulmonary effort is normal.      Breath sounds: Normal breath sounds. No wheezing. No rhonchi.   Chest:      Chest wall: Not tender to palpatation.   Cardiovascular:      Normal rate. Regular rhythm.      Murmurs: There is no murmur.      No rub.   Pulses:     Intact distal pulses.   Edema:     Peripheral edema absent.   Abdominal:      General: Bowel sounds are normal.       Palpations: Abdomen is soft.   Musculoskeletal: Normal range of motion.      Cervical back: Neck supple. Skin:     General: Skin is warm and dry.   Neurological:      Mental Status: Alert and oriented to person, place and time.      Motor: Motor function is intact.       Lab Review:   Lab Results   Component Value Date     10/21/2023    K 4.3 10/21/2023     10/21/2023    CO2 24 10/21/2023    BUN 16 10/21/2023    CREATININE 1.18 10/21/2023    GLUCOSE 100 (H) 10/21/2023    CALCIUM 9.5 10/21/2023     Lab Results   Component Value Date    WBC 7.0 10/21/2023    HGB 16.8 10/21/2023    HCT 48.9 10/21/2023    MCV 85 10/21/2023     10/21/2023       Assessment/Plan   The primary encounter diagnosis was Chronic atrial fibrillation (CMS/HCC). A diagnosis of Essential hypertension was also pertinent to this visit.  Patient is doing well 3 months post ablation and denies any recurrence of his arrhythmia symptoms.  His CHADSVASC score is 1 and he would like to stop his Xarelto.  He would also like to switch back to the amlodipine from the diltiazem for BP control due to fatigue.  We discussed lifestyle modifications that can help maintain normal sinus rhythm such as exercise, weight loss, managing hypertension, treating sleep apnea, and minimizing alcohol intake.  All questions asked and answered.    Stop Xarelto and Diltiazem  Resume 81 mg ASA and 5 mg amlodipine  Activity as tolerated on his broken foot  Follow up with me in 3 months or sooner as needed

## 2024-02-17 LAB
ATRIAL RATE: 73 BPM
P AXIS: 42 DEGREES
P OFFSET: 193 MS
P ONSET: 138 MS
PR INTERVAL: 156 MS
Q ONSET: 216 MS
QRS COUNT: 12 BEATS
QRS DURATION: 96 MS
QT INTERVAL: 396 MS
QTC CALCULATION(BAZETT): 436 MS
QTC FREDERICIA: 423 MS
R AXIS: 2 DEGREES
T AXIS: 11 DEGREES
T OFFSET: 414 MS
VENTRICULAR RATE: 73 BPM

## 2024-05-09 PROBLEM — I10 BENIGN ESSENTIAL HYPERTENSION: Status: ACTIVE | Noted: 2023-11-03

## 2024-05-10 ENCOUNTER — OFFICE VISIT (OUTPATIENT)
Dept: CARDIOLOGY | Facility: CLINIC | Age: 59
End: 2024-05-10
Payer: COMMERCIAL

## 2024-05-10 VITALS
SYSTOLIC BLOOD PRESSURE: 132 MMHG | RESPIRATION RATE: 18 BRPM | BODY MASS INDEX: 37.8 KG/M2 | HEIGHT: 71 IN | OXYGEN SATURATION: 96 % | HEART RATE: 67 BPM | DIASTOLIC BLOOD PRESSURE: 86 MMHG | WEIGHT: 270 LBS

## 2024-05-10 DIAGNOSIS — I48.20 CHRONIC ATRIAL FIBRILLATION (MULTI): Primary | ICD-10-CM

## 2024-05-10 PROCEDURE — 99214 OFFICE O/P EST MOD 30 MIN: CPT | Performed by: NURSE PRACTITIONER

## 2024-05-10 PROCEDURE — 93010 ELECTROCARDIOGRAM REPORT: CPT | Performed by: INTERNAL MEDICINE

## 2024-05-10 PROCEDURE — 3075F SYST BP GE 130 - 139MM HG: CPT | Performed by: NURSE PRACTITIONER

## 2024-05-10 PROCEDURE — 3079F DIAST BP 80-89 MM HG: CPT | Performed by: NURSE PRACTITIONER

## 2024-05-10 PROCEDURE — 1036F TOBACCO NON-USER: CPT | Performed by: NURSE PRACTITIONER

## 2024-05-10 PROCEDURE — 93005 ELECTROCARDIOGRAM TRACING: CPT | Performed by: NURSE PRACTITIONER

## 2024-05-10 ASSESSMENT — ENCOUNTER SYMPTOMS
WEAKNESS: 0
DYSPNEA ON EXERTION: 0
PALPITATIONS: 0
MYALGIAS: 0
DIZZINESS: 1
SNORING: 0
SYNCOPE: 0
HEMOPTYSIS: 0
ORTHOPNEA: 0
DIARRHEA: 0
SORE THROAT: 0
DOUBLE VISION: 0
SHORTNESS OF BREATH: 0
SPUTUM PRODUCTION: 0
LIGHT-HEADEDNESS: 0
VOMITING: 0
BLURRED VISION: 0
DIAPHORESIS: 0
NEAR-SYNCOPE: 0
HEADACHES: 0
ABDOMINAL PAIN: 0
NAUSEA: 0
IRREGULAR HEARTBEAT: 0
PND: 0
FALLS: 0
FEVER: 0
COUGH: 0

## 2024-05-10 ASSESSMENT — PATIENT HEALTH QUESTIONNAIRE - PHQ9
1. LITTLE INTEREST OR PLEASURE IN DOING THINGS: NOT AT ALL
SUM OF ALL RESPONSES TO PHQ9 QUESTIONS 1 AND 2: 0
2. FEELING DOWN, DEPRESSED OR HOPELESS: NOT AT ALL

## 2024-05-10 NOTE — PROGRESS NOTES
"Subjective   Justin Martinez is a 58 y.o. male.    Chief Complaint:  Atrial Fibrillation    Justin Martinez is a 58 year-old with  1. HTN  2. Sleep apnea - moderate to severe diagnosed in 2021  3. Calcium Score 105 2021  4. Atrial fibrillation - index diagnosis in 2021. He feels this was after COVID infection. Every time he has a viral illness he does develop AF. However he has been in AF since May of 2023. In July 2023 monitor showed persistent atrial fibrillation.    Successful DCCV done July 26 2023  TESTING:    -ECHO (2021) structurally and functionally normal     -MONITOR: (June 2023) Preventice 7 days showed AF/AFL ([place] 100%) and 3 runs of NSVT longest lasting 10 beats. Min HR 31 bpm, Max  bpm, Avg HR 75 bpm. VE ([place] <1%)     Now s/p Afib RFA with Dr. Díaz 11/3/2023   ECG 12/8/2023  NSR HR 71 bpm  ECG 2/9/24 NSR HR 73 bpm  ECG 5/10/2024 NSR HR 67 bpm    TODAY Patient presents for 6 month follow up post Afib ablation with Dr. Díaz.  He wears a smart watch during the day and it has not recorded any episodes of Afib.   He has experienced some dizziness twice in the last month while sitting at his desk at work.  His previous Afib symptoms included dizziness and palpitations.  He denies any issues with palpitations.  He admits to weight gain over the last 3 months due to his inability to exercise much since he broke his foot.  He also admits to some LE edema, worse in the left leg which is the side he broke his foot. Recheck of his blood pressure shows improvement to normal reading.      /86   Pulse 67   Resp 18   Ht 1.803 m (5' 11\")   Wt 122 kg (270 lb)   SpO2 96%   BMI 37.66 kg/m²     Current Outpatient Medications on File Prior to Visit   Medication Sig Dispense Refill    amLODIPine (Norvasc) 5 mg tablet Take 1 tablet (5 mg) by mouth once daily. 90 tablet 3    aspirin 81 mg EC tablet Take by mouth once daily.      clobetasol (Temovate) 0.05 % cream APPLY TO LOWER LEGS TWICE DAILY FOR " 2 WEEKS OR UNTIL CLEAR. THEN USE AS NEEDED FOR FLARES      famotidine (Pepcid) 20 mg tablet Take 1 tablet (20 mg) by mouth once daily.      multivitamin tablet Take 1 tablet by mouth once daily.      mv-min-folic acid-lutein 200-137.5 mcg tablet,chewable Chew once daily.       No current facility-administered medications on file prior to visit.         Review of Systems   Constitutional: Negative for diaphoresis, fever and malaise/fatigue.   HENT:  Negative for congestion and sore throat.    Eyes:  Negative for blurred vision and double vision.   Cardiovascular:  Positive for leg swelling. Negative for chest pain, dyspnea on exertion, irregular heartbeat, near-syncope, orthopnea, palpitations, paroxysmal nocturnal dyspnea and syncope.   Respiratory:  Negative for cough, hemoptysis, shortness of breath, snoring and sputum production.    Hematologic/Lymphatic: Negative for bleeding problem.   Skin:  Negative for rash.   Musculoskeletal:  Negative for falls, joint pain and myalgias.   Gastrointestinal:  Negative for abdominal pain, diarrhea, nausea and vomiting.   Neurological:  Positive for dizziness. Negative for headaches, light-headedness and weakness.   All other systems reviewed and are negative.      Objective   Constitutional:       Appearance: Healthy appearance. Not in distress.   Eyes:      Conjunctiva/sclera: Conjunctivae normal.   HENT:      Nose: Nose normal.    Mouth/Throat:      Pharynx: Oropharynx is clear.   Neck:      Vascular: No JVR. JVD normal.   Pulmonary:      Effort: Pulmonary effort is normal.      Breath sounds: Normal breath sounds. No wheezing. No rhonchi.   Chest:      Chest wall: Not tender to palpatation.   Cardiovascular:      Normal rate. Regular rhythm.      Murmurs: There is no murmur.      No rub.   Pulses:     Intact distal pulses.   Edema:     Peripheral edema absent.   Abdominal:      General: Bowel sounds are normal.      Palpations: Abdomen is soft.   Musculoskeletal: Normal  range of motion.      Cervical back: Neck supple. Skin:     General: Skin is warm and dry.   Neurological:      Mental Status: Alert and oriented to person, place and time.      Motor: Motor function is intact.       Lab Review:   Lab Results   Component Value Date     10/21/2023    K 4.3 10/21/2023     10/21/2023    CO2 24 10/21/2023    BUN 16 10/21/2023    CREATININE 1.18 10/21/2023    GLUCOSE 100 (H) 10/21/2023    CALCIUM 9.5 10/21/2023     Lab Results   Component Value Date    WBC 7.0 10/21/2023    HGB 16.8 10/21/2023    HCT 48.9 10/21/2023    MCV 85 10/21/2023     10/21/2023       Assessment/Plan   The encounter diagnosis was Chronic atrial fibrillation (Multi).  We discussed his episodes of dizziness.  Patient was wearing his smart watch at the time and no episodes of Afib were caught.  Otherwise, he denies any symptoms since our last visit.  His episodes of dizziness get better after he takes a deep breath.  He thinks he might be hunched over his desk, holding his breath at the time.    Offered patient to repeat holter monitor, he declined and will continue to wear his smart watch.  He is trying to get back into exercising after injuring his foot  I advised him to stay hydrated.  If the LE edema persists with increased exercise and weight loss, consider changing his HTN medication to something else.  We discussed lifestyle modifications that can help maintain normal sinus rhythm such as exercise, weight loss, managing hypertension, treating sleep apnea, and minimizing alcohol intake.  All questions asked and answered.      Follow up with general cardiology in the next 3-6 months  Follow up with EP as needed

## 2024-05-16 LAB
ATRIAL RATE: 67 BPM
P AXIS: 10 DEGREES
P OFFSET: 201 MS
P ONSET: 141 MS
PR INTERVAL: 158 MS
Q ONSET: 220 MS
QRS COUNT: 11 BEATS
QRS DURATION: 92 MS
QT INTERVAL: 386 MS
QTC CALCULATION(BAZETT): 407 MS
QTC FREDERICIA: 400 MS
R AXIS: -13 DEGREES
T AXIS: 10 DEGREES
T OFFSET: 413 MS
VENTRICULAR RATE: 67 BPM

## 2024-08-23 ENCOUNTER — OFFICE VISIT (OUTPATIENT)
Dept: CARDIOLOGY | Facility: CLINIC | Age: 59
End: 2024-08-23
Payer: COMMERCIAL

## 2024-08-23 VITALS
HEIGHT: 71 IN | HEART RATE: 70 BPM | DIASTOLIC BLOOD PRESSURE: 86 MMHG | OXYGEN SATURATION: 94 % | SYSTOLIC BLOOD PRESSURE: 144 MMHG | BODY MASS INDEX: 37.56 KG/M2 | WEIGHT: 268.25 LBS

## 2024-08-23 DIAGNOSIS — G47.33 OSA (OBSTRUCTIVE SLEEP APNEA): Primary | ICD-10-CM

## 2024-08-23 PROCEDURE — 3008F BODY MASS INDEX DOCD: CPT | Performed by: INTERNAL MEDICINE

## 2024-08-23 PROCEDURE — 3077F SYST BP >= 140 MM HG: CPT | Performed by: INTERNAL MEDICINE

## 2024-08-23 PROCEDURE — 99214 OFFICE O/P EST MOD 30 MIN: CPT | Mod: GC | Performed by: INTERNAL MEDICINE

## 2024-08-23 PROCEDURE — 1036F TOBACCO NON-USER: CPT | Performed by: INTERNAL MEDICINE

## 2024-08-23 PROCEDURE — 3079F DIAST BP 80-89 MM HG: CPT | Performed by: INTERNAL MEDICINE

## 2024-08-23 PROCEDURE — 99214 OFFICE O/P EST MOD 30 MIN: CPT | Performed by: INTERNAL MEDICINE

## 2024-08-23 ASSESSMENT — ENCOUNTER SYMPTOMS
DEPRESSION: 0
HEMATURIA: 0
FALLS: 0
CHILLS: 0
LIGHT-HEADEDNESS: 1
ALTERED MENTAL STATUS: 0
WHEEZING: 0
ABDOMINAL PAIN: 0
HEMOPTYSIS: 0
DYSURIA: 0
VOMITING: 0
LOSS OF SENSATION IN FEET: 0
MYALGIAS: 0
OCCASIONAL FEELINGS OF UNSTEADINESS: 0
MEMORY LOSS: 0
DIARRHEA: 0
COUGH: 0
FEVER: 0
CONSTIPATION: 0
HEADACHES: 0
BLOATING: 0
NAUSEA: 0

## 2024-08-23 ASSESSMENT — PATIENT HEALTH QUESTIONNAIRE - PHQ9
SUM OF ALL RESPONSES TO PHQ9 QUESTIONS 1 AND 2: 0
2. FEELING DOWN, DEPRESSED OR HOPELESS: NOT AT ALL
1. LITTLE INTEREST OR PLEASURE IN DOING THINGS: NOT AT ALL

## 2024-08-23 ASSESSMENT — PAIN SCALES - GENERAL: PAINLEVEL: 0-NO PAIN

## 2024-08-23 NOTE — PROGRESS NOTES
Chief Complaint   Patient presents with    Follow-up    Hypertension    Atrial Fibrillation       HPI  60 yo WM w/ h/o parox AFIB (dx'ed 2/21) s/p RFA 9/23, anom cor, mild elevated CCS, HTN, ALEX (mouth guard) now here for cardiology f/u. Had a few episodes of light-headedness in July that has since resolved. Chronic lower extremity leg swelling pre-dating amlodipine. BP elevated today.     No chest pain. No dyspnea at rest. +occ mild BAY. No orthopnea/PND. No syncope. No claudication. No cough.     iWatch with no AFIB  BP at home - no longer checked - prev - 120s/70s-80s, HR 70s-80s (>100 in AFIB) on watch    ECG 2/20: SR (64), LVH  ECG 2/21: SR (80), PVC  ECG 6/22: SR (60), IMI  ECG 6/22: SR (69), IMI  ECG 8/23: AFIB (86)  ECG 12/23: SR (71), IMI  ECG 2/24: SR (73), nl  ECG 5/24: SR (67), IMI  HM 2/21 (9d): SR w/ parox AFIB (23%, long 24hr, max ), HR  (avg 83)  Echo 4/21: TDS, AFIB, EF 60%, LA/RA up nl size, valves ok  CCS 7/21: 103 (LAD), bord CM, tr PE, AsAo 3.7cm  CXR 10/20: no acute abnl  Sleep study 9/21: mod-sev ALEX    Review of Systems   Constitutional: Negative for chills, fever and malaise/fatigue.   HENT:  Negative for hearing loss.    Eyes:  Negative for visual disturbance.   Cardiovascular:  Positive for leg swelling.   Respiratory:  Negative for cough, hemoptysis and wheezing.    Skin:  Negative for rash.   Musculoskeletal:  Negative for falls and myalgias.   Gastrointestinal:  Negative for bloating, abdominal pain, constipation, diarrhea, dysphagia, nausea and vomiting.   Genitourinary:  Negative for dysuria and hematuria.   Neurological:  Positive for light-headedness. Negative for headaches.   Psychiatric/Behavioral:  Negative for altered mental status, depression and memory loss.       Social History     Tobacco Use    Smoking status: Never    Smokeless tobacco: Never   Substance Use Topics    Alcohol use: Yes     Comment: holidays      Family History   Problem Relation Name Age of  Onset    Coronary artery disease Father        No Known Allergies     Current Outpatient Medications   Medication Instructions    amLODIPine (NORVASC) 5 mg, oral, Daily    aspirin 81 mg EC tablet oral, Daily RT    famotidine (PEPCID) 20 mg, oral, Daily    multivitamin tablet 1 tablet, oral, Daily    mv-min-folic acid-lutein 200-137.5 mcg tablet,chewable oral, Daily RT      Vitals:    08/23/24 0808   BP: 144/86   Pulse: 70   SpO2: 94%      Physical Exam  Constitutional:       Appearance: Normal appearance.   HENT:      Head: Normocephalic and atraumatic.      Nose: Nose normal.   Neck:      Vascular: No carotid bruit.   Cardiovascular:      Rate and Rhythm: Normal rate and regular rhythm.      Heart sounds: No murmur heard.  Pulmonary:      Effort: Pulmonary effort is normal.      Breath sounds: Normal breath sounds.   Abdominal:      Palpations: Abdomen is soft.      Tenderness: There is no abdominal tenderness.   Musculoskeletal:      Right lower leg: Edema present.      Left lower leg: Edema present.      Comments: Tr-1+ pitting LE edema   Skin:     General: Skin is warm and dry.   Neurological:      General: No focal deficit present.      Mental Status: He is alert.   Psychiatric:         Mood and Affect: Mood normal.         Judgment: Judgment normal.        Results/Data  10/23 Cr 1.18, K 4.3, HGB 16.8,   3/21 Cr 1.19, K 4.2, LFT nl, HGB 14.9, , TSH 2.15  10/19 , HDL 38, , Chol 221    Assessment/Plan   60 yo WM w/ h/o parox AFIB (dx'ed 2/21) s/p RFA 9/23, anom cor, mild elevated CCS, HTN, ALEX (mouth guard). Doing well. No further AFIB since RFA 9/23 (he can tell by palps and iWatch). BP high today; better at other appts. He prefers to check BP at home and report if high before adjusting/adding meds now. If BP high, consider adding Chlorthalidone or changing Amlodipine (which may cause some edema) to Losartan-HCTZ. He is also considering CPAP now and trying to lose weight Refer to sleep  med. Avoid/minimize ETOH (he almost never drinks already) and caffeine.   -check blood pressure at home -> report if high  -continue amlodipine 5 mg. If pressures remain elevated consider adding Chlorthalidone or switching to losartan-HCTZ  -continue ASA 81 qd (he defers Xarelto)  -he defers BB or AA at this time (can reconsider if increased AFIB)  -he defers statin or other chol lowering med at this time (wants to try diet/exercise) -> goal LDL <100  -f/u 6 months (earlier if needed)     Lamonte Rosales,   PGY-2  Family Medicine     I saw and evaluated the patient. I personally obtained the key and critical portions of the history and physical exam or was physically present for key and critical portions performed by the resident/fellow. I reviewed the resident/fellow's documentation and discussed the patient with the resident/fellow. I agree with the resident/fellow's medical decision making as documented in the note.    Vasyl Abad MD

## 2024-08-23 NOTE — PATIENT INSTRUCTIONS
Check BP at home this week  Goal at least <140/90, optimal <130/80  If high, let me know ([phone] or mychart)  Then can consider either adding thiazide diuretic

## 2025-02-27 NOTE — PROGRESS NOTES
HPI    59 y.o. male being seen with the following problem list:    Problem list:  Hematuria    02/28/25 - About 2 year ago, saw blood in his underwear, not in urine.    Family history of PCa, father and grandfather had prostate issues/PCa.     PSA   10/2019 - 0.66       Current Medications:  Current Outpatient Medications   Medication Sig Dispense Refill    amLODIPine (Norvasc) 5 mg tablet Take 1 tablet (5 mg) by mouth once daily. 90 tablet 3    aspirin 81 mg EC tablet Take by mouth once daily.      famotidine (Pepcid) 20 mg tablet Take 1 tablet (20 mg) by mouth once daily.      multivitamin tablet Take 1 tablet by mouth once daily.      mv-min-folic acid-lutein 200-137.5 mcg tablet,chewable Chew once daily.       No current facility-administered medications for this visit.        Active Problems:  Justin Martinez is a 59 y.o. male with the following Problems and Medications.  Patient Active Problem List   Diagnosis    Chronic atrial fibrillation (Multi)    HTN (hypertension)    ALEX (obstructive sleep apnea)    Gastroesophageal reflux disease    Anticoagulant long-term use    Chronic neck pain    Atrial fibrillation (Multi)    Umbilical hernia without obstruction and without gangrene    Other melanin hyperpigmentation    Obesity with body mass index 30 or greater    Melanocytic nevi of trunk    Intermittent palpitations    History of basal cell carcinoma (BCC)    Heartburn    Calcification of coronary artery    Edema    Dyslipidemia    Snoring    Chronic venous insufficiency    Atherosclerosis of coronary artery    Acute rhinosinusitis    Actinic keratosis    Benign essential hypertension     Current Outpatient Medications   Medication Sig Dispense Refill    amLODIPine (Norvasc) 5 mg tablet Take 1 tablet (5 mg) by mouth once daily. 90 tablet 3    aspirin 81 mg EC tablet Take by mouth once daily.      famotidine (Pepcid) 20 mg tablet Take 1 tablet (20 mg) by mouth once daily.      multivitamin tablet Take 1 tablet by  mouth once daily.      mv-min-folic acid-lutein 200-137.5 mcg tablet,chewable Chew once daily.       No current facility-administered medications for this visit.       PMH:  Past Medical History:   Diagnosis Date    A-fib (Multi)     Encounter for screening for cardiovascular disorders 10/07/2019    Screening for cardiovascular condition    Encounter for screening for malignant neoplasm of prostate 10/07/2019    Screening for prostate cancer    HLD (hyperlipidemia)     HTN (hypertension)     ALEX (obstructive sleep apnea)        PSH:  Past Surgical History:   Procedure Laterality Date    CARDIAC ELECTROPHYSIOLOGY PROCEDURE N/A 11/3/2023    Procedure: Ablation A-Fib;  Surgeon: Juan Díaz MD;  Location: [place] [address] [facility];  Service: Electrophysiology;  Laterality: N/A;  SDA/RFA AFIB/CARTO/GA WHOLE       FMH:  Family History   Problem Relation Name Age of Onset    Coronary artery disease Father         SHx:  Social History     Tobacco Use    Smoking status: Never    Smokeless tobacco: Never   Substance Use Topics    Alcohol use: Yes     Comment: holidays    Drug use: Never       Allergies:  No Known Allergies      Assessment/Plan  Unclear if this is hematuria or not. I recommend doing micro UA to assess for hematuria, if positive, will address this accordingly.      No recent PSA, will order this for him since his last PSA was few years ago along with family history of PCa.     FU in 2-3 weeks over TH with labs prior.     Scribe Attestation  By signing my name below, I, Whitney Bui, attest that this documentation has been prepared under the direction and in the presence of Lebron Yates MD.

## 2025-02-28 ENCOUNTER — OFFICE VISIT (OUTPATIENT)
Dept: UROLOGY | Facility: HOSPITAL | Age: 60
End: 2025-02-28
Payer: COMMERCIAL

## 2025-02-28 ENCOUNTER — OFFICE VISIT (OUTPATIENT)
Dept: CARDIOLOGY | Facility: CLINIC | Age: 60
End: 2025-02-28
Payer: COMMERCIAL

## 2025-02-28 VITALS
HEIGHT: 71 IN | DIASTOLIC BLOOD PRESSURE: 82 MMHG | BODY MASS INDEX: 38.13 KG/M2 | HEART RATE: 70 BPM | WEIGHT: 272.38 LBS | OXYGEN SATURATION: 95 % | SYSTOLIC BLOOD PRESSURE: 130 MMHG

## 2025-02-28 DIAGNOSIS — R60.9 EDEMA, UNSPECIFIED TYPE: Primary | ICD-10-CM

## 2025-02-28 DIAGNOSIS — Z12.5 PROSTATE CANCER SCREENING: Primary | ICD-10-CM

## 2025-02-28 DIAGNOSIS — I10 ESSENTIAL HYPERTENSION: ICD-10-CM

## 2025-02-28 DIAGNOSIS — R31.9 HEMATURIA, UNSPECIFIED TYPE: ICD-10-CM

## 2025-02-28 PROCEDURE — 1036F TOBACCO NON-USER: CPT | Performed by: UROLOGY

## 2025-02-28 PROCEDURE — 99204 OFFICE O/P NEW MOD 45 MIN: CPT | Performed by: UROLOGY

## 2025-02-28 PROCEDURE — 1036F TOBACCO NON-USER: CPT | Performed by: INTERNAL MEDICINE

## 2025-02-28 PROCEDURE — 99213 OFFICE O/P EST LOW 20 MIN: CPT | Performed by: INTERNAL MEDICINE

## 2025-02-28 PROCEDURE — 99214 OFFICE O/P EST MOD 30 MIN: CPT | Performed by: UROLOGY

## 2025-02-28 PROCEDURE — 3075F SYST BP GE 130 - 139MM HG: CPT | Performed by: INTERNAL MEDICINE

## 2025-02-28 PROCEDURE — 3079F DIAST BP 80-89 MM HG: CPT | Performed by: INTERNAL MEDICINE

## 2025-02-28 PROCEDURE — 3008F BODY MASS INDEX DOCD: CPT | Performed by: INTERNAL MEDICINE

## 2025-02-28 RX ORDER — AMLODIPINE BESYLATE 5 MG/1
5 TABLET ORAL DAILY
Qty: 90 TABLET | Refills: 3 | Status: SHIPPED | OUTPATIENT
Start: 2025-02-28

## 2025-02-28 ASSESSMENT — PATIENT HEALTH QUESTIONNAIRE - PHQ9
2. FEELING DOWN, DEPRESSED OR HOPELESS: NOT AT ALL
SUM OF ALL RESPONSES TO PHQ9 QUESTIONS 1 AND 2: 0
SUM OF ALL RESPONSES TO PHQ9 QUESTIONS 1 AND 2: 0
1. LITTLE INTEREST OR PLEASURE IN DOING THINGS: NOT AT ALL
1. LITTLE INTEREST OR PLEASURE IN DOING THINGS: NOT AT ALL
2. FEELING DOWN, DEPRESSED OR HOPELESS: NOT AT ALL

## 2025-02-28 ASSESSMENT — ENCOUNTER SYMPTOMS
MEMORY LOSS: 0
DYSURIA: 0
LOSS OF SENSATION IN FEET: 0
ABDOMINAL PAIN: 0
FEVER: 0
HEADACHES: 0
DEPRESSION: 0
OCCASIONAL FEELINGS OF UNSTEADINESS: 0
WHEEZING: 0
MYALGIAS: 0
CHILLS: 0
CONSTIPATION: 0
HEMOPTYSIS: 0
NAUSEA: 0
ALTERED MENTAL STATUS: 0
HEMATURIA: 0
COUGH: 0
LIGHT-HEADEDNESS: 1
DIARRHEA: 0
VOMITING: 0
BLOATING: 0
FALLS: 0

## 2025-02-28 ASSESSMENT — PAIN SCALES - GENERAL: PAINLEVEL_OUTOF10: 0-NO PAIN

## 2025-02-28 ASSESSMENT — COLUMBIA-SUICIDE SEVERITY RATING SCALE - C-SSRS
6. HAVE YOU EVER DONE ANYTHING, STARTED TO DO ANYTHING, OR PREPARED TO DO ANYTHING TO END YOUR LIFE?: NO
1. IN THE PAST MONTH, HAVE YOU WISHED YOU WERE DEAD OR WISHED YOU COULD GO TO SLEEP AND NOT WAKE UP?: NO
2. HAVE YOU ACTUALLY HAD ANY THOUGHTS OF KILLING YOURSELF?: NO

## 2025-02-28 NOTE — PROGRESS NOTES
Chief Complaint   Patient presents with    Follow-up    Hypertension    Atrial Fibrillation    Palpitations       HPI  60 yo WM w/ h/o parox AFIB (dx'ed 2/21) s/p RFA 9/23, anom cor, mild elevated CCS, HTN, ALEX (mouth guard) now here for cardiology f/u. No chest pain. +occ having to take a deep breath (he claims prev had this during AFIB). No other dyspnea at rest. +occ mild BAY. No orthopnea/PND. No palps. No LH/dizzy/syncope. +ch LE edema (predated Amlodipine). No claudication. No cough.   BP at home: 130s/80s, HR 70s-80s  iWatch with no AFIB  ECG 2/20: SR (64), LVH  ECG 2/21: SR (80), PVC  ECG 6/22: SR (60), IMI  ECG 6/22: SR (69), IMI  ECG 8/23: AFIB (86)  ECG 12/23: SR (71), IMI  ECG 2/24: SR (73), nl  ECG 5/24: SR (67), IMI  HM 2/21 (9d): SR w/ parox AFIB (23%, long 24hr, max ), HR  (avg 83)  Echo 4/21: TDS, AFIB, EF 60%, LA/RA up nl size, valves ok  CCS 7/21: 103 (LAD), bord CM, tr PE, AsAo 3.7cm  CXR 10/20: no acute abnl  Sleep study 9/21: mod-sev ALEX    Review of Systems   Constitutional: Negative for chills, fever and malaise/fatigue.   HENT:  Negative for hearing loss.    Eyes:  Negative for visual disturbance.   Cardiovascular:  Positive for leg swelling.   Respiratory:  Negative for cough, hemoptysis and wheezing.    Skin:  Negative for rash.   Musculoskeletal:  Negative for falls and myalgias.   Gastrointestinal:  Negative for bloating, abdominal pain, constipation, diarrhea, dysphagia, nausea and vomiting.   Genitourinary:  Negative for dysuria and hematuria.   Neurological:  Positive for light-headedness. Negative for headaches.   Psychiatric/Behavioral:  Negative for altered mental status, depression and memory loss.       Social History     Tobacco Use    Smoking status: Never    Smokeless tobacco: Never   Substance Use Topics    Alcohol use: Yes     Comment: holidays      Family History   Problem Relation Name Age of Onset    Coronary artery disease Father        No Known Allergies  "    Current Outpatient Medications   Medication Instructions    amLODIPine (NORVASC) 5 mg, oral, Daily    aspirin 81 mg EC tablet Daily RT    famotidine (PEPCID) 20 mg, Daily    multivitamin tablet 1 tablet, Daily    mv-min-folic acid-lutein 200-137.5 mcg tablet,chewable Daily RT      /82 (BP Location: Left arm, Patient Position: Sitting, BP Cuff Size: Large adult)   Pulse 70   Ht 1.803 m (5' 11\")   Wt 124 kg (272 lb 6 oz)   SpO2 95%   BMI 37.99 kg/m²       Physical Exam  Constitutional:       Appearance: Normal appearance.   HENT:      Head: Normocephalic and atraumatic.      Nose: Nose normal.   Neck:      Vascular: No carotid bruit.   Cardiovascular:      Rate and Rhythm: Normal rate and regular rhythm.      Heart sounds: No murmur heard.  Pulmonary:      Effort: Pulmonary effort is normal.      Breath sounds: Normal breath sounds.   Abdominal:      Palpations: Abdomen is soft.      Tenderness: There is no abdominal tenderness.   Musculoskeletal:      Right lower le+ Pitting Edema present.      Left lower le+ Pitting Edema present.   Skin:     General: Skin is warm and dry.   Neurological:      General: No focal deficit present.      Mental Status: He is alert.   Psychiatric:         Mood and Affect: Mood normal.         Judgment: Judgment normal.        Results/Data  10/23 Cr 1.18, K 4.3, HGB 16.8,   3/21 Cr 1.19, K 4.2, LFT nl, HGB 14.9, , TSH 2.15  10/19 , HDL 38, , Chol 221    Assessment/Plan   58 yo WM w/ h/o parox AFIB (dx'ed ) s/p RFA , anom cor, mild elevated CCS, HTN, ALEX (mouth guard). Doing well. No further clear AFIB since RFA  (he can tell by palps and iWatch). Unclear etiology of 1+ pitting LE edema, ?CHF, ?CCB SE. Check labs including Cr/K/BNP. Will await for updated lab results before deciding on which diuretic to use for edema/BP (he prefers lightest). If BNP elevated, consider updated echo.  Avoid/minimize ETOH (he almost never drinks " already) and caffeine.   -continue ASA 81 qd (he defers DOAC; also maintaining SR)  -continue Amlodipine 5 mg (if BP well controlled, consider weaning down/off)  -he defers statin or other chol lowering med (prefers diet/exercise) -> goal LDL <100  -f/u 6 months (earlier if needed)     Vasyl Abad MD

## 2025-03-01 LAB
ALBUMIN SERPL-MCNC: 4.6 G/DL (ref 3.6–5.1)
ALP SERPL-CCNC: 74 U/L (ref 35–144)
ALT SERPL-CCNC: 31 U/L (ref 9–46)
ANION GAP SERPL CALCULATED.4IONS-SCNC: 9 MMOL/L (CALC) (ref 7–17)
APPEARANCE UR: CLEAR
AST SERPL-CCNC: 19 U/L (ref 10–35)
BILIRUB SERPL-MCNC: 0.6 MG/DL (ref 0.2–1.2)
BILIRUB UR QL STRIP: NEGATIVE
BNP SERPL-MCNC: NORMAL PG/ML
BUN SERPL-MCNC: 13 MG/DL (ref 7–25)
CALCIUM SERPL-MCNC: 9.3 MG/DL (ref 8.6–10.3)
CHLORIDE SERPL-SCNC: 107 MMOL/L (ref 98–110)
CHOLEST SERPL-MCNC: 225 MG/DL
CHOLEST/HDLC SERPL: 5.1 (CALC)
CO2 SERPL-SCNC: 25 MMOL/L (ref 20–32)
COLOR UR: ABNORMAL
CREAT SERPL-MCNC: 0.98 MG/DL (ref 0.7–1.35)
EGFRCR SERPLBLD CKD-EPI 2021: 88 ML/MIN/1.73M2
ERYTHROCYTE [DISTWIDTH] IN BLOOD BY AUTOMATED COUNT: 12.7 % (ref 11–15)
GLUCOSE SERPL-MCNC: 75 MG/DL (ref 65–139)
GLUCOSE UR QL STRIP: NEGATIVE
HCT VFR BLD AUTO: 47.8 % (ref 38.5–50)
HDLC SERPL-MCNC: 44 MG/DL
HGB BLD-MCNC: 16.2 G/DL (ref 13.2–17.1)
HGB UR QL STRIP: NEGATIVE
KETONES UR QL STRIP: ABNORMAL
LDLC SERPL CALC-MCNC: 151 MG/DL (CALC)
LEUKOCYTE ESTERASE UR QL STRIP: NEGATIVE
MCH RBC QN AUTO: 29 PG (ref 27–33)
MCHC RBC AUTO-ENTMCNC: 33.9 G/DL (ref 32–36)
MCV RBC AUTO: 85.5 FL (ref 80–100)
NITRITE UR QL STRIP: NEGATIVE
NONHDLC SERPL-MCNC: 181 MG/DL (CALC)
PH UR STRIP: 5.5 [PH] (ref 5–8)
PLATELET # BLD AUTO: 190 THOUSAND/UL (ref 140–400)
PMV BLD REES-ECKER: 12 FL (ref 7.5–12.5)
POTASSIUM SERPL-SCNC: 4.3 MMOL/L (ref 3.5–5.3)
PROT SERPL-MCNC: 7.1 G/DL (ref 6.1–8.1)
PROT UR QL STRIP: NEGATIVE
PSA SERPL-MCNC: 0.41 NG/ML
RBC # BLD AUTO: 5.59 MILLION/UL (ref 4.2–5.8)
SODIUM SERPL-SCNC: 141 MMOL/L (ref 135–146)
SP GR UR STRIP: 1.02 (ref 1–1.03)
TRIGL SERPL-MCNC: 166 MG/DL
TSH SERPL-ACNC: 2.1 MIU/L (ref 0.4–4.5)
WBC # BLD AUTO: 7.5 THOUSAND/UL (ref 3.8–10.8)

## 2025-03-03 DIAGNOSIS — I10 HYPERTENSION, UNSPECIFIED TYPE: Primary | ICD-10-CM

## 2025-03-03 LAB
ALBUMIN SERPL-MCNC: 4.6 G/DL (ref 3.6–5.1)
ALP SERPL-CCNC: 74 U/L (ref 35–144)
ALT SERPL-CCNC: 31 U/L (ref 9–46)
ANION GAP SERPL CALCULATED.4IONS-SCNC: 9 MMOL/L (CALC) (ref 7–17)
AST SERPL-CCNC: 19 U/L (ref 10–35)
BILIRUB SERPL-MCNC: 0.6 MG/DL (ref 0.2–1.2)
BNP SERPL-MCNC: 4 PG/ML
BUN SERPL-MCNC: 13 MG/DL (ref 7–25)
CALCIUM SERPL-MCNC: 9.3 MG/DL (ref 8.6–10.3)
CHLORIDE SERPL-SCNC: 107 MMOL/L (ref 98–110)
CHOLEST SERPL-MCNC: 225 MG/DL
CHOLEST/HDLC SERPL: 5.1 (CALC)
CO2 SERPL-SCNC: 25 MMOL/L (ref 20–32)
CREAT SERPL-MCNC: 0.98 MG/DL (ref 0.7–1.35)
EGFRCR SERPLBLD CKD-EPI 2021: 88 ML/MIN/1.73M2
ERYTHROCYTE [DISTWIDTH] IN BLOOD BY AUTOMATED COUNT: 12.7 % (ref 11–15)
GLUCOSE SERPL-MCNC: 75 MG/DL (ref 65–139)
HCT VFR BLD AUTO: 47.8 % (ref 38.5–50)
HDLC SERPL-MCNC: 44 MG/DL
HGB BLD-MCNC: 16.2 G/DL (ref 13.2–17.1)
LDLC SERPL CALC-MCNC: 151 MG/DL (CALC)
MCH RBC QN AUTO: 29 PG (ref 27–33)
MCHC RBC AUTO-ENTMCNC: 33.9 G/DL (ref 32–36)
MCV RBC AUTO: 85.5 FL (ref 80–100)
NONHDLC SERPL-MCNC: 181 MG/DL (CALC)
PLATELET # BLD AUTO: 190 THOUSAND/UL (ref 140–400)
PMV BLD REES-ECKER: 12 FL (ref 7.5–12.5)
POTASSIUM SERPL-SCNC: 4.3 MMOL/L (ref 3.5–5.3)
PROT SERPL-MCNC: 7.1 G/DL (ref 6.1–8.1)
RBC # BLD AUTO: 5.59 MILLION/UL (ref 4.2–5.8)
SODIUM SERPL-SCNC: 141 MMOL/L (ref 135–146)
TRIGL SERPL-MCNC: 166 MG/DL
TSH SERPL-ACNC: 2.1 MIU/L (ref 0.4–4.5)
WBC # BLD AUTO: 7.5 THOUSAND/UL (ref 3.8–10.8)

## 2025-03-03 RX ORDER — CHLORTHALIDONE 25 MG/1
25 TABLET ORAL DAILY
Qty: 90 TABLET | Refills: 1 | Status: SHIPPED | OUTPATIENT
Start: 2025-03-03 | End: 2026-03-03

## 2025-03-20 ENCOUNTER — TELEMEDICINE (OUTPATIENT)
Dept: UROLOGY | Facility: HOSPITAL | Age: 60
End: 2025-03-20
Payer: COMMERCIAL

## 2025-03-20 DIAGNOSIS — Z12.5 PROSTATE CANCER SCREENING: ICD-10-CM

## 2025-03-20 DIAGNOSIS — R31.9 HEMATURIA, UNSPECIFIED TYPE: Primary | ICD-10-CM

## 2025-03-20 PROCEDURE — 99213 OFFICE O/P EST LOW 20 MIN: CPT | Performed by: UROLOGY

## 2025-08-29 ENCOUNTER — APPOINTMENT (OUTPATIENT)
Dept: CARDIOLOGY | Facility: CLINIC | Age: 60
End: 2025-08-29
Payer: COMMERCIAL

## (undated) DEVICE — SHEATH, STEERABLE, SUREFLEX, MEDIUM CURVE

## (undated) DEVICE — CATHETER, DIAGNOSTIC, SOUNDSTAR ECO SMS, 8FR

## (undated) DEVICE — CABLE, 34 HYP, 34 LEMO, 10FT, SMART TOUCH (REPROCESS)

## (undated) DEVICE — CATHETHER, CS, BI-DIRECTIONAL, 10 POLES, D-F TYPE

## (undated) DEVICE — TUBING SET, IRRIGATION, SMARTABLATE

## (undated) DEVICE — INTRODUCER, HEMOSTASIS, STR/J .038 IN, 8.5FR 12CM

## (undated) DEVICE — CATHETER, PENTARAY, NAV ECO, 7FR, 2-6-2 SPACING, D CURVE

## (undated) DEVICE — CABLE, CARTO 3 SYSTEM, ECO INTERFACE, 34-PIN, SPLIT HANDLE (REPROCESSED)

## (undated) DEVICE — CLOSURE SYSTEM, VASCULAR, MVP 6-12FR, VENOUS

## (undated) DEVICE — NEEDLE, NRG TRANSSEPTAL, 98CM, CURVE C0

## (undated) DEVICE — INTRODUCER, SHEATH, FAST-CATH, 8FR X 12CM, C-LOCK

## (undated) DEVICE — CATHETER, THERMOCOOL SMART TOUCH, SF, D-F CURVE

## (undated) DEVICE — PATCHES, EXTERNAL REFERENCE, CARTO3

## (undated) DEVICE — INTERFACE CABLE, EXISITING DX CATHETERS, BLUE PORT (REPROCESS)

## (undated) DEVICE — ELECTRODE, QUICK-COMBO, EDGE SYSTEM, REDI PACK

## (undated) DEVICE — CABLE, CONNECTOR, 10FT